# Patient Record
Sex: MALE | Race: WHITE | URBAN - NONMETROPOLITAN AREA
[De-identification: names, ages, dates, MRNs, and addresses within clinical notes are randomized per-mention and may not be internally consistent; named-entity substitution may affect disease eponyms.]

---

## 2018-03-08 ENCOUNTER — OFFICE VISIT (OUTPATIENT)
Dept: FAMILY MEDICINE CLINIC | Age: 43
End: 2018-03-08
Payer: COMMERCIAL

## 2018-03-08 VITALS
DIASTOLIC BLOOD PRESSURE: 68 MMHG | WEIGHT: 185 LBS | HEART RATE: 80 BPM | HEIGHT: 69 IN | BODY MASS INDEX: 27.4 KG/M2 | SYSTOLIC BLOOD PRESSURE: 128 MMHG

## 2018-03-08 VITALS — WEIGHT: 184 LBS | DIASTOLIC BLOOD PRESSURE: 70 MMHG | HEART RATE: 72 BPM | SYSTOLIC BLOOD PRESSURE: 130 MMHG

## 2018-03-08 DIAGNOSIS — R20.0 NUMBNESS AND TINGLING: ICD-10-CM

## 2018-03-08 DIAGNOSIS — H10.9 CONJUNCTIVITIS OF RIGHT EYE, UNSPECIFIED CONJUNCTIVITIS TYPE: ICD-10-CM

## 2018-03-08 DIAGNOSIS — M79.601 PAIN OF RIGHT UPPER EXTREMITY: Primary | ICD-10-CM

## 2018-03-08 DIAGNOSIS — R20.2 NUMBNESS AND TINGLING: ICD-10-CM

## 2018-03-08 DIAGNOSIS — K64.9 BLEEDING HEMORRHOIDS: ICD-10-CM

## 2018-03-08 PROCEDURE — 99214 OFFICE O/P EST MOD 30 MIN: CPT | Performed by: FAMILY MEDICINE

## 2018-03-08 RX ORDER — TOBRAMYCIN 3 MG/ML
2 SOLUTION/ DROPS OPHTHALMIC EVERY 4 HOURS
Qty: 1 BOTTLE | Refills: 0 | Status: SHIPPED | OUTPATIENT
Start: 2018-03-08 | End: 2018-03-15

## 2018-03-08 RX ORDER — MELOXICAM 15 MG/1
15 TABLET ORAL DAILY
COMMUNITY
End: 2018-03-08 | Stop reason: SDUPTHER

## 2018-03-08 RX ORDER — MELOXICAM 15 MG/1
15 TABLET ORAL DAILY
Qty: 90 TABLET | Refills: 1 | Status: SHIPPED | OUTPATIENT
Start: 2018-03-08 | End: 2018-06-06 | Stop reason: SDUPTHER

## 2018-03-08 RX ORDER — GABAPENTIN 300 MG/1
300 CAPSULE ORAL 3 TIMES DAILY
Qty: 90 CAPSULE | Refills: 2 | Status: SHIPPED | OUTPATIENT
Start: 2018-03-08 | End: 2018-06-06 | Stop reason: SDUPTHER

## 2018-03-08 ASSESSMENT — PATIENT HEALTH QUESTIONNAIRE - PHQ9
SUM OF ALL RESPONSES TO PHQ9 QUESTIONS 1 & 2: 0
1. LITTLE INTEREST OR PLEASURE IN DOING THINGS: 0
SUM OF ALL RESPONSES TO PHQ QUESTIONS 1-9: 0
2. FEELING DOWN, DEPRESSED OR HOPELESS: 0

## 2018-03-08 ASSESSMENT — ENCOUNTER SYMPTOMS: ANAL BLEEDING: 1

## 2018-06-06 ENCOUNTER — OFFICE VISIT (OUTPATIENT)
Dept: FAMILY MEDICINE CLINIC | Age: 43
End: 2018-06-06
Payer: COMMERCIAL

## 2018-06-06 VITALS
DIASTOLIC BLOOD PRESSURE: 80 MMHG | BODY MASS INDEX: 26.73 KG/M2 | SYSTOLIC BLOOD PRESSURE: 110 MMHG | WEIGHT: 181 LBS | HEART RATE: 64 BPM

## 2018-06-06 DIAGNOSIS — R20.0 NUMBNESS AND TINGLING: Primary | ICD-10-CM

## 2018-06-06 DIAGNOSIS — Z72.0 TOBACCO USE: ICD-10-CM

## 2018-06-06 DIAGNOSIS — R20.2 NUMBNESS AND TINGLING: Primary | ICD-10-CM

## 2018-06-06 PROCEDURE — 99213 OFFICE O/P EST LOW 20 MIN: CPT | Performed by: FAMILY MEDICINE

## 2018-06-06 RX ORDER — MELOXICAM 15 MG/1
15 TABLET ORAL DAILY PRN
Qty: 90 TABLET | Refills: 0 | Status: SHIPPED | OUTPATIENT
Start: 2018-06-06 | End: 2018-12-03

## 2018-06-06 RX ORDER — GABAPENTIN 300 MG/1
300 CAPSULE ORAL DAILY
Qty: 90 CAPSULE | Refills: 1 | Status: SHIPPED | OUTPATIENT
Start: 2018-06-06 | End: 2018-12-03

## 2020-08-03 LAB
CHOLESTEROL, TOTAL: 209 MG/DL
CHOLESTEROL/HDL RATIO: 4.9
GLUCOSE FASTING: 96 MG/DL
HDLC SERPL-MCNC: 43 MG/DL (ref 35–70)
LDL CHOLESTEROL CALCULATED: 140 MG/DL (ref 0–160)
TRIGL SERPL-MCNC: 135 MG/DL
VLDLC SERPL CALC-MCNC: 27 MG/DL

## 2023-04-28 ENCOUNTER — OFFICE VISIT (OUTPATIENT)
Dept: SURGERY | Age: 48
End: 2023-04-28
Payer: COMMERCIAL

## 2023-04-28 VITALS
HEIGHT: 69 IN | DIASTOLIC BLOOD PRESSURE: 98 MMHG | BODY MASS INDEX: 25.62 KG/M2 | OXYGEN SATURATION: 96 % | SYSTOLIC BLOOD PRESSURE: 140 MMHG | WEIGHT: 173 LBS | HEART RATE: 75 BPM | TEMPERATURE: 98.2 F | RESPIRATION RATE: 16 BRPM

## 2023-04-28 DIAGNOSIS — K64.4 EXTERNAL HEMORRHOIDS WITH COMPLICATION: Primary | ICD-10-CM

## 2023-04-28 DIAGNOSIS — Z80.0 FAMILY HISTORY OF COLON CANCER: ICD-10-CM

## 2023-04-28 PROCEDURE — 99203 OFFICE O/P NEW LOW 30 MIN: CPT | Performed by: SURGERY

## 2023-04-28 ASSESSMENT — ENCOUNTER SYMPTOMS
DIARRHEA: 0
CHEST TIGHTNESS: 0
ANAL BLEEDING: 1
ABDOMINAL PAIN: 0
CONSTIPATION: 0
SHORTNESS OF BREATH: 0
CHOKING: 0
ABDOMINAL DISTENTION: 0

## 2023-04-28 NOTE — PROGRESS NOTES
Noticed some anal swelling, soreness and bleeding, symptomatic over the past few day. Discomfort is an intense pressure. Stinging sensation. Has had problems for several years. History of I&D in the past.    Used preparation H. No ice pack, sitz baths, stool softeners etc.    No pain medications. No known history of abscess, fissure, fistula, colitis, Crohn's disease    No past medical history on file. Past Surgical History:   Procedure Laterality Date    HEMORRHOID SURGERY  01/08/2013    thrombosed hemorrhoid     No current outpatient medications on file. No current facility-administered medications for this visit. No Known Allergies  Social History     Tobacco Use    Smoking status: Every Day     Packs/day: 0.50     Types: Cigarettes    Smokeless tobacco: Never   Substance Use Topics    Alcohol use: No     No family history on file. Review of Systems   Constitutional:  Positive for activity change (pain has limited his activity. ). Negative for appetite change, chills, fatigue, fever and unexpected weight change. Respiratory:  Negative for choking, chest tightness and shortness of breath. Cardiovascular:  Negative for chest pain and palpitations. Gastrointestinal:  Positive for anal bleeding. Negative for abdominal distention, abdominal pain, constipation and diarrhea. BP (!) 140/98 (Site: Left Upper Arm, Position: Sitting)   Pulse 75   Temp 98.2 °F (36.8 °C)   Resp 16   Ht 5' 9\" (1.753 m)   Wt 173 lb (78.5 kg)   SpO2 96%   BMI 25.55 kg/m²     Physical Exam  Constitutional:       General: He is not in acute distress. Appearance: He is normal weight. He is not ill-appearing or diaphoretic. Eyes:      General: No scleral icterus. Cardiovascular:      Rate and Rhythm: Normal rate and regular rhythm. Pulmonary:      Effort: Pulmonary effort is normal.      Breath sounds: Normal breath sounds. Abdominal:      General: Abdomen is flat.  Bowel sounds are normal.

## 2024-08-21 ENCOUNTER — OFFICE VISIT (OUTPATIENT)
Dept: FAMILY MEDICINE CLINIC | Age: 49
End: 2024-08-21
Payer: COMMERCIAL

## 2024-08-21 VITALS
HEIGHT: 69 IN | SYSTOLIC BLOOD PRESSURE: 116 MMHG | WEIGHT: 175 LBS | DIASTOLIC BLOOD PRESSURE: 82 MMHG | HEART RATE: 76 BPM | OXYGEN SATURATION: 96 % | BODY MASS INDEX: 25.92 KG/M2

## 2024-08-21 DIAGNOSIS — Z12.5 PROSTATE CANCER SCREENING: ICD-10-CM

## 2024-08-21 DIAGNOSIS — Z87.891 PERSONAL HISTORY OF TOBACCO USE: ICD-10-CM

## 2024-08-21 DIAGNOSIS — M25.512 ACUTE PAIN OF LEFT SHOULDER: ICD-10-CM

## 2024-08-21 DIAGNOSIS — M54.2 NECK PAIN: Primary | ICD-10-CM

## 2024-08-21 DIAGNOSIS — Z00.00 ROUTINE GENERAL MEDICAL EXAMINATION AT A HEALTH CARE FACILITY: ICD-10-CM

## 2024-08-21 PROCEDURE — 99204 OFFICE O/P NEW MOD 45 MIN: CPT

## 2024-08-21 PROCEDURE — G0296 VISIT TO DETERM LDCT ELIG: HCPCS

## 2024-08-21 RX ORDER — METHYLPREDNISOLONE 4 MG
TABLET, DOSE PACK ORAL
Qty: 1 KIT | Refills: 0 | Status: SHIPPED | OUTPATIENT
Start: 2024-08-21 | End: 2024-08-27

## 2024-08-21 RX ORDER — CYCLOBENZAPRINE HCL 10 MG
10 TABLET ORAL 3 TIMES DAILY PRN
Qty: 21 TABLET | Refills: 0 | Status: SHIPPED | OUTPATIENT
Start: 2024-08-21 | End: 2024-08-28

## 2024-08-21 RX ORDER — IBUPROFEN 600 MG/1
600 TABLET, FILM COATED ORAL EVERY 6 HOURS PRN
COMMUNITY
Start: 2024-08-18

## 2024-08-21 SDOH — ECONOMIC STABILITY: FOOD INSECURITY: WITHIN THE PAST 12 MONTHS, YOU WORRIED THAT YOUR FOOD WOULD RUN OUT BEFORE YOU GOT MONEY TO BUY MORE.: NEVER TRUE

## 2024-08-21 SDOH — ECONOMIC STABILITY: FOOD INSECURITY: WITHIN THE PAST 12 MONTHS, THE FOOD YOU BOUGHT JUST DIDN'T LAST AND YOU DIDN'T HAVE MONEY TO GET MORE.: NEVER TRUE

## 2024-08-21 SDOH — ECONOMIC STABILITY: INCOME INSECURITY: HOW HARD IS IT FOR YOU TO PAY FOR THE VERY BASICS LIKE FOOD, HOUSING, MEDICAL CARE, AND HEATING?: NOT HARD AT ALL

## 2024-08-21 ASSESSMENT — PATIENT HEALTH QUESTIONNAIRE - PHQ9
SUM OF ALL RESPONSES TO PHQ9 QUESTIONS 1 & 2: 0
SUM OF ALL RESPONSES TO PHQ QUESTIONS 1-9: 0
2. FEELING DOWN, DEPRESSED OR HOPELESS: NOT AT ALL
SUM OF ALL RESPONSES TO PHQ QUESTIONS 1-9: 0
1. LITTLE INTEREST OR PLEASURE IN DOING THINGS: NOT AT ALL

## 2024-08-21 NOTE — PROGRESS NOTES
[facility]  [address] [address]  Dept: [phone]    Date of Service:  8/21/2024    Herman Rodriguez is a 48 y.o. male who presents in office today with Self.    Chief Complaint   Patient presents with    New Patient     Patient is here today to establish as well as discuss his back issues. He was seen at a [facility] ER in [place] on 8/18 for this issue.         Diagnoses / Plan:   1. Neck pain  -     cyclobenzaprine (FLEXERIL) 10 MG tablet; Take 1 tablet by mouth 3 times daily as needed for Muscle spasms, Disp-21 tablet, R-0Normal  -     methylPREDNISolone (MEDROL DOSEPACK) 4 MG tablet; Take by mouth., Disp-1 kit, R-0Normal  2. Acute pain of left shoulder  -     cyclobenzaprine (FLEXERIL) 10 MG tablet; Take 1 tablet by mouth 3 times daily as needed for Muscle spasms, Disp-21 tablet, R-0Normal  -     methylPREDNISolone (MEDROL DOSEPACK) 4 MG tablet; Take by mouth., Disp-1 kit, R-0Normal  -Likely muscle strain, will start on Medrol Dosepak and Flexeril.  Monitor for symptom improvement, if worsening please let office know right away.  Side effects of both these medications are discussed.  No driving while on Flexeril.  3. Routine general medical examination at a health care facility  -     CBC with Auto Differential; Future  -     Comprehensive Metabolic Panel, Fasting; Future  -     PSA Screening; Future  -     Lipid, Fasting; Future  4. Prostate cancer screening  -     PSA Screening; Future  5. Personal history of tobacco use  -     MA VISIT TO DISCUSS LUNG CA SCREEN W LDCT  -     CT Lung Screen (Initial/Annual/Baseline); Future       Flexeril, Medrol Dosepak medication sent to the pharmacy.  Discussed medication desired effects, potential side effects, and how to take the medication.  Encouraged symptomatic treatment, rest, increase oral fluid intake.  Follow-up for worsening or persistent symptoms.  Patient verbalizes understanding regarding plan of care and all questions

## 2024-08-21 NOTE — PATIENT INSTRUCTIONS
Learning About Lung Cancer Screening  What is screening for lung cancer?     Lung cancer screening is a way to find some lung cancers early, before a person has any symptoms of the cancer.  Lung cancer screening may help those who have the highest risk for lung cancer--people age 50 and older who are or were heavy smokers. For most people, who aren't at increased risk, screening for lung cancer probably isn't helpful.  Screening won't prevent cancer. And it may not find all lung cancers. Lung cancer screening may lower the risk of dying from lung cancer in a small number of people.  How is it done?  Lung cancer screening is done with a low-dose CT (computed tomography) scan. A CT scan uses X-rays, or radiation, to make detailed pictures of your body. Experts recommend that screening be done in medical centers that focus on finding and treating lung cancer.  Who is screening recommended for?  Lung cancer screening is recommended for people age 50 and older who are or were heavy smokers. That means people with a smoking history of at least 20 pack years. A pack year is a way to measure how heavy a smoker you are or were.  To figure out your pack years, multiply how many packs a day on average (assuming 20 cigarettes per pack) you have smoked by how many years you have smoked. For example:  If you smoked 1 pack a day for 20 years, that's 1 times 20. So you have a smoking history of 20 pack years.  If you smoked 2 packs a day for 10 years, that's 2 times 10. So you have a smoking history of 20 pack years.  Experts agree that screening is for people who have a high risk of lung cancer. But experts don't agree on what high risk means. Some say people age 50 or older with at least a 20-pack-year smoking history are high risk. Others say it's people age 55 or older with a 30-pack-year history.  To see if you could benefit from screening, first find out if you are at high risk for lung cancer. Your doctor can help you  follow-up, he or she will help you understand what to do next.  After a lung cancer screening, you can go back to your usual activities right away.  A lung cancer screening test can't tell if you have lung cancer. If your results are positive, your doctor can't tell whether an abnormal finding is a harmless nodule, cancer, or something else without doing more tests.  What can you do to help prevent lung cancer?  Some lung cancers can't be prevented. But if you smoke, quitting smoking is the best step you can take to prevent lung cancer. If you want to quit, your doctor can recommend medicines or other ways to help.  Follow-up care is a key part of your treatment and safety. Be sure to make and go to all appointments, and call your doctor if you are having problems. It's also a good idea to know your test results and keep a list of the medicines you take.  Where can you learn more?  Go to https://www.[employer].net/patientEd and enter Q940 to learn more about \"Learning About Lung Cancer Screening.\"  Current as of: October 25, 2023  Content Version: 14.1  © 6811-2299 [employer].   Care instructions adapted under license by [employer]. If you have questions about a medical condition or this instruction, always ask your healthcare professional. [employer] disclaims any warranty or liability for your use of this information.

## 2024-08-27 ASSESSMENT — ENCOUNTER SYMPTOMS
COLOR CHANGE: 0
BACK PAIN: 1
ABDOMINAL PAIN: 0
COUGH: 0
WHEEZING: 0
CHEST TIGHTNESS: 0
SHORTNESS OF BREATH: 0

## 2024-08-28 ENCOUNTER — OFFICE VISIT (OUTPATIENT)
Dept: FAMILY MEDICINE CLINIC | Age: 49
End: 2024-08-28
Payer: COMMERCIAL

## 2024-08-28 VITALS
DIASTOLIC BLOOD PRESSURE: 80 MMHG | WEIGHT: 175 LBS | SYSTOLIC BLOOD PRESSURE: 140 MMHG | OXYGEN SATURATION: 96 % | BODY MASS INDEX: 25.84 KG/M2 | HEART RATE: 91 BPM

## 2024-08-28 DIAGNOSIS — M54.2 NECK PAIN: ICD-10-CM

## 2024-08-28 DIAGNOSIS — R20.2 NUMBNESS AND TINGLING: Primary | ICD-10-CM

## 2024-08-28 DIAGNOSIS — R20.0 NUMBNESS AND TINGLING: Primary | ICD-10-CM

## 2024-08-28 DIAGNOSIS — K64.9 ACUTE HEMORRHOID: ICD-10-CM

## 2024-08-28 PROCEDURE — 99214 OFFICE O/P EST MOD 30 MIN: CPT

## 2024-08-28 RX ORDER — METHOCARBAMOL 750 MG/1
750 TABLET, FILM COATED ORAL 4 TIMES DAILY
Qty: 40 TABLET | Refills: 0 | Status: SHIPPED | OUTPATIENT
Start: 2024-08-28 | End: 2024-09-07

## 2024-08-28 RX ORDER — HYDROCORTISONE ACETATE 25 MG/1
25 SUPPOSITORY RECTAL EVERY 12 HOURS
Qty: 12 SUPPOSITORY | Refills: 2 | Status: SHIPPED | OUTPATIENT
Start: 2024-08-28

## 2024-08-28 NOTE — PROGRESS NOTES
[facility]  [address] [address]  Dept: [phone]    Date of Service:  8/28/2024    Herman Rodriguez is a 48 y.o. male who presents in office today with Self.    Chief Complaint   Patient presents with    Back Pain     Patient is here today for his back and neck pain. States medication did not help at all.         Diagnoses / Plan:   1. Numbness and tingling  -     XR Cervical Spine 2 or 3 VW; Future  -     External Referral To Physical Therapy  -     methocarbamol (ROBAXIN-750) 750 MG tablet; Take 1 tablet by mouth 4 times daily for 10 days, Disp-40 tablet, R-0Normal  2. Neck pain  -     XR Cervical Spine 2 or 3 VW; Future  -     External Referral To Physical Therapy  -     methocarbamol (ROBAXIN-750) 750 MG tablet; Take 1 tablet by mouth 4 times daily for 10 days, Disp-40 tablet, R-0Normal  -Will change medication to Robaxin to see if this helps.  Steroids did not seem to help her, Flexeril did not seem to help, Wales for emergency department did not help as well.  Does have some numbness and tingling going down left arm.  Will get x-ray.  He states he did not have trauma that precipitated this however it is a newer onset pain with tingling.  Send to physical therapy, consider MRI if no relief.  3. Acute hemorrhoid  -     hydrocortisone (ANUSOL-HC) 25 MG suppository; Place 1 suppository rectally in the morning and 1 suppository in the evening., Disp-12 suppository, R-2Normal   -Chronic longstanding probably exacerbated due to being on [place] from emergency department.  Will send in Anusol suppositories to help with this as well as encouraged oral fluid and MiraLAX to soften stool.    Robaxin, Anusol medication sent to the pharmacy.  Discussed medication desired effects, potential side effects, and how to take the medication.  Encouraged symptomatic treatment, rest, increase oral fluid intake.  Follow-up for worsening or persistent symptoms.  Patient verbalizes

## 2024-09-03 LAB
ALBUMIN/GLOBULIN RATIO: 1.8 G/DL
ALBUMIN: 4.2 G/DL (ref 3.5–5)
ALP BLD-CCNC: 49 UNITS/L (ref 38–126)
ALT SERPL-CCNC: 22 UNITS/L (ref 4–50)
ANION GAP SERPL CALCULATED.3IONS-SCNC: 5.6 MMOL/L (ref 3–11)
AST SERPL-CCNC: 27 UNITS/L (ref 17–59)
BASOPHILS ABSOLUTE: 0.06 X10E3/?L (ref 0–0.3)
BASOPHILS RELATIVE PERCENT: 0.87 % (ref 0–3)
BILIRUB SERPL-MCNC: 0.5 MG/DL (ref 0.2–1.3)
BUN BLDV-MCNC: 18 MG/DL (ref 9–20)
CALCIUM SERPL-MCNC: 9.1 MG/DL (ref 8.4–10.2)
CHLORIDE BLD-SCNC: 107 MMOL/L (ref 98–120)
CHOLESTEROL, TOTAL: 233 MG/DL (ref 50–200)
CHOLESTEROL/HDL RATIO: 5.97 RATIO (ref 0–4.5)
CO2: 27 MMOL/L (ref 22–31)
CREAT SERPL-MCNC: 1 MG/DL (ref 0.7–1.3)
DIAGNOSTIC PSA: 0.84 NG/ML (ref 0–4)
EOSINOPHILS ABSOLUTE: 0.18 X10E3/?L (ref 0–1.1)
EOSINOPHILS RELATIVE PERCENT: 2.43 % (ref 0–10)
GFR, ESTIMATED: > 60
GLOBULIN: 2.3 G/DL
GLUCOSE: 92 MG/DL (ref 75–110)
HCT VFR BLD CALC: 49.5 % (ref 42–52)
HDLC SERPL-MCNC: 39 MG/DL (ref 36–68)
HEMOGLOBIN: 15.8 G/DL (ref 13.8–17.8)
LDL CHOLESTEROL: 160.6 MG/DL (ref 0–160)
LYMPHOCYTES ABSOLUTE: 2.37 X10E3/?L (ref 1–5.5)
LYMPHOCYTES RELATIVE PERCENT: 32.12 % (ref 20–51.1)
MCH RBC QN AUTO: 30.1 PG (ref 28.5–32.5)
MCHC RBC AUTO-ENTMCNC: 31.9 G/DL (ref 32–37)
MCV RBC AUTO: 94.1 FL (ref 80–94)
MONOCYTES ABSOLUTE: 0.59 X10E3/?L (ref 0.1–1)
MONOCYTES RELATIVE PERCENT: 7.98 % (ref 1.7–9.3)
NEUTROPHILS ABSOLUTE: 4.18 X10E3/?L (ref 2–8.1)
NEUTROPHILS RELATIVE PERCENT: 56.6 % (ref 42.2–75.2)
PDW BLD-RTO: 12.5 % (ref 10–15.5)
PLATELET # BLD: 188.1 THOU/MM3 (ref 130–400)
POTASSIUM SERPL-SCNC: 4.4 MMOL/L (ref 3.6–5)
RBC # BLD: 5.27 M/UL (ref 4.7–6.1)
SODIUM BLD-SCNC: 140 MMOL/L (ref 135–145)
TOTAL PROTEIN: 6.5 G/DL (ref 6.3–8.2)
TRIGL SERPL-MCNC: 167 MG/DL (ref 10–250)
VLDLC SERPL CALC-MCNC: 33 MG/DL (ref 0–50)
WBC # BLD: 7.4 THOU/ML3 (ref 4.8–10.8)

## 2024-09-04 DIAGNOSIS — R91.1 RIGHT MIDDLE LOBE PULMONARY NODULE: Primary | ICD-10-CM

## 2024-09-04 ASSESSMENT — ENCOUNTER SYMPTOMS
CHEST TIGHTNESS: 0
COLOR CHANGE: 0
COUGH: 0
WHEEZING: 0
BACK PAIN: 1
ABDOMINAL PAIN: 0
SHORTNESS OF BREATH: 0

## 2024-09-10 RX ORDER — CYCLOBENZAPRINE HCL 10 MG
10 TABLET ORAL 3 TIMES DAILY PRN
Qty: 60 TABLET | Refills: 0 | Status: SHIPPED | OUTPATIENT
Start: 2024-09-10

## 2024-09-10 RX ORDER — IBUPROFEN 600 MG/1
600 TABLET, FILM COATED ORAL EVERY 6 HOURS PRN
Qty: 120 TABLET | Refills: 1 | Status: SHIPPED | OUTPATIENT
Start: 2024-09-10

## 2024-09-10 RX ORDER — CYCLOBENZAPRINE HCL 10 MG
10 TABLET ORAL 3 TIMES DAILY PRN
COMMUNITY
End: 2024-09-10 | Stop reason: SDUPTHER

## 2024-10-10 ENCOUNTER — OFFICE VISIT (OUTPATIENT)
Dept: NEUROSURGERY | Age: 49
End: 2024-10-10
Payer: COMMERCIAL

## 2024-10-10 VITALS
DIASTOLIC BLOOD PRESSURE: 84 MMHG | WEIGHT: 175.6 LBS | HEART RATE: 58 BPM | SYSTOLIC BLOOD PRESSURE: 120 MMHG | BODY MASS INDEX: 26.01 KG/M2 | HEIGHT: 69 IN

## 2024-10-10 DIAGNOSIS — M47.22 CERVICAL SPONDYLOSIS WITH RADICULOPATHY: Primary | ICD-10-CM

## 2024-10-10 PROCEDURE — 99203 OFFICE O/P NEW LOW 30 MIN: CPT | Performed by: NURSE PRACTITIONER

## 2024-10-10 NOTE — PROGRESS NOTES
[facility]  [address] [address]  [address]  [place] [id number]  Dept: [phone]    Patient:  Herman Rodriguez  YOB: 1975  Date: 10/10/24    The patient is a 48 y.o. male who presents today for consult of the following problems:     Chief Complaint   Patient presents with    New Patient     Numbness and tingling      Numbness    Tingling    Shoulder Pain     Patient stated when he moves his left arm he start having left shoulder pain.         HPI:     Herman Rodriguez is a 48 y.o. male on whom neurosurgical consultation was requested by Jose A Johnson APRN - CNP for management of neck and left arm pain. Symptoms started in August, was very severe at first, pain was interfering with all activities.  Denies any distinct inciting event.  No history significant for any chronic neck pain or similar symptoms in the past.  Pain was radiating down left upper extremity and approximately C6/C7 distribution.  Did have subjective weakness of left arm initially.  Reports approximate 2-week prodromal phase of neck aching, thought he had slept wrong.    Was eventually evaluated in the emergency department.  Did have x-rays completed, was treated with IV steroids.  Subsequently followed up with PCP.  Has been undergoing physical therapy, has completed approximately 4 weeks attending 2-3 times weekly.  Has appreciated significant overall improvement.  Pain is now minimal, was \"12\"/10 at its worst.    Attending therapy in [place], has been going 2-3 times weekly for 4-6 weeks.   Did use ibuprofen, muscle relaxer. Did also complete IV steroids in the ED.     History:     History reviewed. No pertinent past medical history.  Past Surgical History:   Procedure Laterality Date    COLONOSCOPY  05/26/2023    [facility],     HEMORRHOID SURGERY  01/08/2013    thrombosed hemorrhoid     Family History   Problem Relation Age of Onset    Brain Cancer